# Patient Record
Sex: FEMALE | HISPANIC OR LATINO | URBAN - METROPOLITAN AREA
[De-identification: names, ages, dates, MRNs, and addresses within clinical notes are randomized per-mention and may not be internally consistent; named-entity substitution may affect disease eponyms.]

---

## 2019-04-10 ENCOUNTER — OFFICE VISIT (OUTPATIENT)
Dept: URGENT CARE | Facility: CLINIC | Age: 34
End: 2019-04-10

## 2019-04-10 VITALS
OXYGEN SATURATION: 98 % | TEMPERATURE: 97 F | HEIGHT: 69 IN | HEART RATE: 68 BPM | BODY MASS INDEX: 18.28 KG/M2 | SYSTOLIC BLOOD PRESSURE: 126 MMHG | RESPIRATION RATE: 16 BRPM | DIASTOLIC BLOOD PRESSURE: 78 MMHG | WEIGHT: 123.44 LBS

## 2019-04-10 DIAGNOSIS — L50.9 HIVES: Primary | ICD-10-CM

## 2019-04-10 PROCEDURE — 96372 PR INJECTION,THERAP/PROPH/DIAG2ST, IM OR SUBCUT: ICD-10-PCS | Mod: S$GLB,,, | Performed by: NURSE PRACTITIONER

## 2019-04-10 PROCEDURE — 99203 OFFICE O/P NEW LOW 30 MIN: CPT | Mod: 25,S$GLB,, | Performed by: NURSE PRACTITIONER

## 2019-04-10 PROCEDURE — 96372 THER/PROPH/DIAG INJ SC/IM: CPT | Mod: S$GLB,,, | Performed by: NURSE PRACTITIONER

## 2019-04-10 PROCEDURE — 99203 PR OFFICE/OUTPT VISIT, NEW, LEVL III, 30-44 MIN: ICD-10-PCS | Mod: 25,S$GLB,, | Performed by: NURSE PRACTITIONER

## 2019-04-10 RX ORDER — BETAMETHASONE SODIUM PHOSPHATE AND BETAMETHASONE ACETATE 3; 3 MG/ML; MG/ML
9 INJECTION, SUSPENSION INTRA-ARTICULAR; INTRALESIONAL; INTRAMUSCULAR; SOFT TISSUE
Status: COMPLETED | OUTPATIENT
Start: 2019-04-10 | End: 2019-04-10

## 2019-04-10 RX ADMIN — BETAMETHASONE SODIUM PHOSPHATE AND BETAMETHASONE ACETATE 9 MG: 3; 3 INJECTION, SUSPENSION INTRA-ARTICULAR; INTRALESIONAL; INTRAMUSCULAR; SOFT TISSUE at 05:04

## 2019-04-10 NOTE — PATIENT INSTRUCTIONS
Allergic Reaction   If your condition worsens or fails to improve we recommend that you receive another evaluation at the ER immediately or contact your PCP to discuss your concerns or return here. You must understand that you've received an urgent care treatment only and that you may be released before all your medical problems are known or treated. You the patient will arrange for followup care as instructed.   Stop the use of shampoos and soaps in the hotel.  Use the shampoo and soaps that you normally use at home that are for sensitive skin.  Zyrtec 10 mg for 7 days to prevent or suppress the itching. You can take Benadryl 25 -50 mg as well as needed for itching.   If you develop additional symptoms such as tongue swelling or trouble breathing go immediately to the ER.           Urticaria (adulto)  La urticaria es dolores afección en la que hay protuberancias rosadas o rojizas en la piel. Estas protuberancias también se conocen bartolome ronchas. Las protuberancias pueden picar, arder o pinchar. Pueden aparecer en cualquier parte del cuerpo. Son de distinto tamaño y forma, y pueden aparecer en grupos. Dolores roncha tirso puede aparecer y desaparecer rápidamente. Puede que aparezcan nuevas ronchas cuando se van las anteriores. La urticaria (muchas ronchas) es algo común y no suele ser nada para preocuparse. En ocasiones, la urticaria puede ser un signo de dolores alergia grave.  La urticaria suele deberse a dolores reacción alérgica. Puede ser dolores reacción alérgica a alimentos tales bartolome frutas, mariscos, chocolate, nueces o tomates. Puede ser dolores reacción al polen, al pelo de los animales o a las esporas de los hongos. Algunos medicamentos, productos químicos y picaduras de insectos también pueden causar urticaria. La urticaria puede deberse, asimismo, al sol fadumo o al aire frío. Puede ser difícil encontrar la causa de la urticaria.  Puede que le den medicamentos para aliviar la inflamación y la  picazón. Siga todas las instrucciones para usar estos medicamentos. Las ronchas por lo general se irán en unos pocos días, rosalia pueden durar hasta dos semanas.  Cuidados en la casa  [place] lo siguiente:  · Intente encontrar la causa de brown urticaria y elimínela o evítela. Hable con brown proveedor de atención médica sobre las posibles causas. Las futuras reacciones al mismo alérgeno pueden ser peores.  · No rasque las zonas con urticaria. Rascarse demorará la cicatrización. Para aliviar la picazón, aplíquese compresas frías y húmedas sobre la piel.  · Vístase con ropa de algodón suave y liviana.  · No se bañe con [place]. [place] puede empeorar la picazón.  · Aplíquese sobre la piel dolores compresa de hielo o dolores compresa fría envuelta en dolores toalla delgada. [place] ayudará a reducir el enrojecimiento y la picazón. [place] si la urticaria se debe a exposición al frío, no apique más frío sobre mckayla.  · Puede usar antihistamínicos de venta krupa para aliviar la comezón. Algunos antihistamínicos más antiguos, bartolome la difenhidramina y la clorfeniramina son económicos. [place] hay que tomarlos con frecuencia y producen somnolencia, es mejor usalos a la hora de dormir. No use difenhidramina ni clorfeniramina si tiene glaucoma o problemas al orinar por un agrandamiento de la próstata. Los antihistamínicos más modernos, bartolome la loratadina, la cetirizina y la fexofenadina, por lo general son más caros, rosalia tambien suelen tener menos efectos secundarios, bartolome la somnolencia. Se pueden kendal con menos frecuencia.  · Otro tipo de antihistamínicos se usa para tratar la acidez. [place] tipo incluye la ranitidina, nizatidina, famotidina y cimetidina. A veces se los utiliza junto con los antihistamínicos anteriores si un único medicamento no está dando resultado.  Visita de control  Programe dolores visita de control con brown proveedor de atención médica si [place] síntomas no mejoran en los siguientes dos días. Pregúntele a brown proveedor acerca de hacerse  pruebas de alergia si tuvo alguna reacción grave o tuvo varios episodios de urticaria. Brown proveedor puede hacerle pruebas de alergia para feliberto a qué le tiene alergia.  ¿Cuándo debe buscar atención médica?  Llame a brown proveedor de atención médica de inmediato si sucede cualquiera de las siguientes cosas:  · Fiebre de 100.4 °F (38.0 °C) o más gely  · Enrojecimiento, inflamación o dolor  · Líquido maloliente que sale de las ronchas  Llame al 911  Llame al 911 si tiene:  · Hinchazón de la manuel, la lengua o la garganta  · Dificultades para respirar o tragar  · Mareo, debilidad o desmayo  Date Last Reviewed: 12/20/2016  © 4941-7341 [employer]. [address] [place] [id number]. Todos los derechos reservados. Esta información no pretende sustituir la atención médica profesional. Sólo brown médico puede diagnosticar y tratar un problema de nanette.

## 2019-04-10 NOTE — PROGRESS NOTES
"Subjective:       Patient ID: Linda Berg is a 33 y.o. female.    Vitals:  height is 5' 8.5" (1.74 m) and weight is 56 kg (123 lb 7.3 oz). Her temperature is 97.4 °F (36.3 °C). Her blood pressure is 126/78 and her pulse is 68. Her respiration is 16 and oxygen saturation is 98%.     Chief Complaint: Insect Bite    Patient presents for complaint of hives.  She regionally checked in with bedbugs because she was unsure photo that she has of her symptoms that she took of herself show obvious whelps.  There are no bites.  She 1st noticed this 2 days ago.  There generalized in the coming go they are very itchy.  She has not taken anything for this.  She is staying in a hotel and used their hotel soap and shampoo and thinks that maybe this could be a culprit.  She also ate lobster but does not associate onset with the hives.  She does not have a history of allergies to shellfish.  She states that normally when she is home she uses sensitive skin soaps.  No one else with the hives.    Allergic Reaction   This is a new problem. The current episode started 2 days ago. The problem has been waxing and waning since onset. The problem is moderate. The patient was exposed to food. Associated symptoms include itching and a rash. Pertinent negatives include no abdominal pain, chest pain, chest pressure, coughing, diarrhea, difficulty breathing, drooling, eye itching, eye redness, eye watering, globus sensation, hyperventilation, skin blistering, stridor, trouble swallowing, vomiting or wheezing.       Constitution: Negative for chills, sweating, fatigue and fever.   HENT: Negative for drooling, facial swelling, congestion, sore throat and trouble swallowing.    Neck: Negative for neck pain and painful lymph nodes.   Cardiovascular: Negative for chest pain.   Eyes: Negative for eye itching, eye redness and eyelid swelling.   Respiratory: Negative for cough, stridor and wheezing.    Gastrointestinal: Negative for abdominal pain, nausea, " vomiting and diarrhea.   Musculoskeletal: Negative for joint pain, joint swelling and muscle ache.   Skin: Positive for rash. Negative for color change, pale, wound, abrasion, laceration, lesion, skin thickening/induration, puncture wound, erythema, bruising, abscess, avulsion and hives.   Allergic/Immunologic: Negative for environmental allergies, immunocompromised state and hives.   Neurological: Negative for history of vertigo, headaches and numbness.   Hematologic/Lymphatic: Negative for swollen lymph nodes.       Objective:      Physical Exam   Constitutional: She is oriented to person, place, and time. She appears well-developed and well-nourished.   HENT:   Head: Normocephalic and atraumatic. Head is without abrasion, without contusion and without laceration.   Right Ear: External ear normal.   Left Ear: External ear normal.   Nose: Nose normal.   Mouth/Throat: Uvula is midline, oropharynx is clear and moist and mucous membranes are normal. No oropharyngeal exudate, posterior oropharyngeal edema or posterior oropharyngeal erythema.   Eyes: Pupils are equal, round, and reactive to light. Conjunctivae, EOM and lids are normal.   Neck: Trachea normal, full passive range of motion without pain and phonation normal. Neck supple.   Cardiovascular: Normal rate, regular rhythm and normal heart sounds.   Pulmonary/Chest: Effort normal and breath sounds normal. No stridor. No respiratory distress. She has no wheezes.   Musculoskeletal: Normal range of motion.   Neurological: She is alert and oriented to person, place, and time.   Skin: Skin is warm, dry and intact. Capillary refill takes less than 2 seconds. Rash noted. No abrasion, no bruising, no burn, no ecchymosis, no laceration and no lesion noted. Rash is urticarial. No erythema.   Resolving scattered hives when compared to photos on patient's phone   Psychiatric: She has a normal mood and affect. Her speech is normal and behavior is normal. Judgment and thought  content normal. Cognition and memory are normal.   Nursing note and vitals reviewed.      Assessment:       1. Hives        Plan:         Hives  -     betamethasone acetate-betamethasone sodium phosphate injection 9 mg      Patient Instructions                                Allergic Reaction   If your condition worsens or fails to improve we recommend that you receive another evaluation at the ER immediately or contact your PCP to discuss your concerns or return here. You must understand that you've received an urgent care treatment only and that you may be released before all your medical problems are known or treated. You the patient will arrange for followup care as instructed.   Stop the use of shampoos and soaps in the hotel.  Use the shampoo and soaps that you normally use at home that are for sensitive skin.  Zyrtec 10 mg for 7 days to prevent or suppress the itching. You can take Benadryl 25 -50 mg as well as needed for itching.   If you develop additional symptoms such as tongue swelling or trouble breathing go immediately to the ER.           Urticaria (adulto)  La urticaria es scarlet afección en la que hay protuberancias rosadas o rojizas en la piel. Estas protuberancias también se conocen bartolome ronchas. Las protuberancias pueden picar, arder o pinchar. Pueden aparecer en cualquier parte del cuerpo. Son de distinto tamaño y forma, y pueden aparecer en grupos. Scarlet roncha tirso puede aparecer y desaparecer rápidamente. Puede que aparezcan nuevas ronchas cuando se van las anteriores. La urticaria (muchas ronchas) es algo común y no suele ser nada para preocuparse. En ocasiones, la urticaria puede ser un signo de scarlet alergia grave.  La urticaria suele deberse a scarlet reacción alérgica. Puede ser scarlet reacción alérgica a alimentos tales bartolome frutas, mariscos, chocolate, nueces o tomates. Puede ser scarlet reacción al polen, al pelo de los animales o a las esporas de los hongos. Algunos medicamentos, productos químicos  y picaduras de insectos también pueden causar urticaria. La urticaria puede deberse, asimismo, al sol fadumo o al aire frío. Puede ser difícil encontrar la causa de la urticaria.  Puede que le den medicamentos para aliviar la inflamación y la picazón. Siga todas las instrucciones para usar estos medicamentos. Las ronchas por lo general se irán en unos pocos días, rosalia pueden durar hasta dos semanas.  Cuidados en la casa  [place] lo siguiente:  · Intente encontrar la causa de brown urticaria y elimínela o evítela. Hable con brown proveedor de atención médica sobre las posibles causas. Las futuras reacciones al mismo alérgeno pueden ser peores.  · No rasque las zonas con urticaria. Rascarse demorará la cicatrización. Para aliviar la picazón, aplíquese compresas frías y húmedas sobre la piel.  · Vístase con ropa de algodón suave y liviana.  · No se bañe con [place]. [place] puede empeorar la picazón.  · Aplíquese sobre la piel dolores compresa de hielo o dolores compresa fría envuelta en dolores toalla delgada. [place] ayudará a reducir el enrojecimiento y la picazón. [place] si la urticaria se debe a exposición al frío, no apique más frío sobre mckayla.  · Puede usar antihistamínicos de venta krupa para aliviar la comezón. Algunos antihistamínicos más antiguos, bartolome la difenhidramina y la clorfeniramina son económicos. [place] hay que tomarlos con frecuencia y producen somnolencia, es mejor usalos a la hora de dormir. No use difenhidramina ni clorfeniramina si tiene glaucoma o problemas al orinar por un agrandamiento de la próstata. Los antihistamínicos más modernos, bartolome la loratadina, la cetirizina y la fexofenadina, por lo general son más caros, rosalia tambien suelen tener menos efectos secundarios, bartolome la somnolencia. Se pueden kendal con menos frecuencia.  · Otro tipo de antihistamínicos se usa para tratar la acidez. [place] tipo incluye la ranitidina, nizatidina, famotidina y cimetidina. A veces se los utiliza junto con los antihistamínicos  anteriores si un único medicamento no está dando resultado.  Visita de control  Programe dolores visita de control con brown proveedor de atención médica si [place] síntomas no mejoran en los siguientes dos días. Pregúntele a brown proveedor acerca de hacerse pruebas de alergia si tuvo alguna reacción grave o tuvo varios episodios de urticaria. Brown proveedor puede hacerle pruebas de alergia para feliberto a qué le tiene alergia.  ¿Cuándo debe buscar atención médica?  Llame a brown proveedor de atención médica de inmediato si sucede cualquiera de las siguientes cosas:  · Fiebre de 100.4 °F (38.0 °C) o más gely  · Enrojecimiento, inflamación o dolor  · Líquido maloliente que sale de las ronchas  Llame al 911  Llame al 911 si tiene:  · Hinchazón de la manuel, la lengua o la garganta  · Dificultades para respirar o tragar  · Mareo, debilidad o desmayo  Date Last Reviewed: 12/20/2016  © 5878-0936 [employer]. [address] [place] [id number]. Todos los derechos reservados. Esta información no pretende sustituir la atención médica profesional. Sólo brown médico puede diagnosticar y tratar un problema de nanette.